# Patient Record
Sex: FEMALE | Race: OTHER | NOT HISPANIC OR LATINO | URBAN - METROPOLITAN AREA
[De-identification: names, ages, dates, MRNs, and addresses within clinical notes are randomized per-mention and may not be internally consistent; named-entity substitution may affect disease eponyms.]

---

## 2017-06-16 NOTE — PATIENT DISCUSSION
RETINA IS ATTACHED OU: PVD OD ONLY; PERIPHERAL RETINOSCHISIS OU; NO NEW HOLES OR TEARS SEEN ON DILATED EXAM TODAY.  RETINAL DETACHMENT SIGNS AND SYMPTOMS REVIEWED

## 2017-08-29 NOTE — PATIENT DISCUSSION
RETINAL DETACHMENT OD: S/P LASER BARRICADE. STABLE. RECOMMEND PATIENT RETURN EVERY 6 MOS TO MONITOR.  CONTINUE CARE WITH DR. Andres Del Cid

## 2017-12-15 NOTE — PATIENT DISCUSSION
RETINAL DETACHMENT OD: S/P LASER BARRICADE. STABLE. RECOMMEND PATIENT RETURN EVERY 6 MONTHS TO MONITOR.

## 2018-06-22 NOTE — PATIENT DISCUSSION
Retinoschisis is an eye disease characterized by the abnormal splitting of the retina's neurosensory layers, usually in the outer plexiform layer. Most common forms are asymptomatic, some rarer forms result in a loss of vision in the corresponding visual field.

## 2018-06-22 NOTE — PATIENT DISCUSSION
REVIEWED PVD/RD PRE-CAUTIONS WITH PATIENT AND ADVISED PT TO CALL IF EXPERIENCES NEW FLASHES OF LIGHT, INCREASE IN FLOATERS, OR DECREASE VISION IN EITHER EYE.

## 2018-06-22 NOTE — PATIENT DISCUSSION
RETINA IS ATTACHED OU: PVD OD ONLY; TEMPORAL RETINAL DETACHMENT SURROUNDED BY LASER OD; STABLE RETINOSCHISIS OU; NO NEW HOLES OR TEARS SEEN ON DILATED EXAM TODAY.  RETINAL DETACHMENT SIGNS AND SYMPTOMS REVIEWED

## 2019-08-16 NOTE — PATIENT DISCUSSION
RETINA IS ATTACHED OU: LATTICE DEGENERATION OS; PVD OD ONLY; TEMPORAL RETINAL DETACHMENT SURROUNDED BY LASER OD; STABLE RETINOSCHISIS OU; NO NEW HOLES OR TEARS SEEN ON DILATED EXAM TODAY.  RETINAL DETACHMENT SIGNS AND SYMPTOMS REVIEWED

## 2020-02-14 NOTE — PATIENT DISCUSSION
RETINA IS ATTACHED OU: PVD OD ONLY; TEMPORAL RETINAL DETACHMENT SURROUNDED BY LASER OD; STABLE RETINOSCHISIS OU; LATTICE DEGENERATION OS; NO NEW HOLES OR TEARS SEEN ON DILATED EXAM TODAY.  RETINAL DETACHMENT SIGNS AND SYMPTOMS REVIEWED

## 2020-08-28 NOTE — PATIENT DISCUSSION
RETINA IS ATTACHED OU: PVD OD; TEMPORAL RETINAL DETACHMENT SURROUNDED BY LASER OD; STABLE RETINOSCHISIS OU; LATTICE DEGENERATION OS; NO NEW HOLES OR TEARS SEEN ON DILATED EXAM TODAY.  RETINAL DETACHMENT SIGNS AND SYMPTOMS REVIEWED

## 2021-08-06 NOTE — PATIENT DISCUSSION
RETINA IS ATTACHED OU: PVD OD; TEMPORAL RETINAL DETACHMENT SURROUNDED BY LASER BARRICADE OD; STABLE PERIPHERAL RETINOSCHISIS OU; LATTICE DEGENERATION OS; NO NEW HOLES OR TEARS SEEN ON DILATED EXAM TODAY.  RETINAL DETACHMENT SIGNS AND SYMPTOMS REVIEWED

## 2022-11-16 ENCOUNTER — NEW PATIENT (OUTPATIENT)
Dept: URBAN - METROPOLITAN AREA CLINIC 17 | Facility: CLINIC | Age: 67
End: 2022-11-16

## 2022-11-16 DIAGNOSIS — H43.813: ICD-10-CM

## 2022-11-16 DIAGNOSIS — H04.123: ICD-10-CM

## 2022-11-16 DIAGNOSIS — H52.4: ICD-10-CM

## 2022-11-16 PROCEDURE — 92134 CPTRZ OPH DX IMG PST SGM RTA: CPT

## 2022-11-16 PROCEDURE — 99204 OFFICE O/P NEW MOD 45 MIN: CPT

## 2022-11-16 ASSESSMENT — TONOMETRY
OS_IOP_MMHG: 20
OD_IOP_MMHG: 19

## 2022-11-16 ASSESSMENT — VISUAL ACUITY
OD_CC: 20/25-2
OS_CC: 20/25-2

## 2022-12-09 NOTE — PATIENT DISCUSSION
Retinoschisis remains stable OU.  The margins of the cavity have not progressed as confirmed by exam.

## 2023-04-05 ENCOUNTER — FOLLOW UP (OUTPATIENT)
Dept: URBAN - METROPOLITAN AREA CLINIC 17 | Facility: CLINIC | Age: 68
End: 2023-04-05

## 2023-04-05 DIAGNOSIS — H01.024: ICD-10-CM

## 2023-04-05 DIAGNOSIS — H01.021: ICD-10-CM

## 2023-04-05 DIAGNOSIS — H04.123: ICD-10-CM

## 2023-04-05 DIAGNOSIS — H02.883: ICD-10-CM

## 2023-04-05 DIAGNOSIS — H02.886: ICD-10-CM

## 2023-04-05 PROCEDURE — 99213 OFFICE O/P EST LOW 20 MIN: CPT

## 2023-04-05 ASSESSMENT — TONOMETRY
OS_IOP_MMHG: 17
OD_IOP_MMHG: 17

## 2023-04-05 ASSESSMENT — VISUAL ACUITY
OD_CC: 20/25
OS_CC: 20/25

## 2023-07-10 ENCOUNTER — FOLLOW UP (OUTPATIENT)
Dept: URBAN - METROPOLITAN AREA CLINIC 17 | Facility: CLINIC | Age: 68
End: 2023-07-10

## 2023-07-10 DIAGNOSIS — H02.886: ICD-10-CM

## 2023-07-10 DIAGNOSIS — H02.883: ICD-10-CM

## 2023-07-10 DIAGNOSIS — H01.024: ICD-10-CM

## 2023-07-10 DIAGNOSIS — H01.021: ICD-10-CM

## 2023-07-10 DIAGNOSIS — H04.123: ICD-10-CM

## 2023-07-10 PROCEDURE — 99213 OFFICE O/P EST LOW 20 MIN: CPT

## 2023-07-10 ASSESSMENT — TONOMETRY
OD_IOP_MMHG: 12
OS_IOP_MMHG: 13

## 2023-07-10 ASSESSMENT — VISUAL ACUITY
OU_CC: 20/20
OS_CC: 20/20
OD_CC: 20/20

## 2023-07-14 ENCOUNTER — EMERGENCY VISIT (OUTPATIENT)
Dept: URBAN - METROPOLITAN AREA CLINIC 17 | Facility: CLINIC | Age: 68
End: 2023-07-14

## 2023-07-14 DIAGNOSIS — H04.123: ICD-10-CM

## 2023-07-14 DIAGNOSIS — B30.9: ICD-10-CM

## 2023-07-14 DIAGNOSIS — H02.886: ICD-10-CM

## 2023-07-14 DIAGNOSIS — H02.883: ICD-10-CM

## 2023-07-14 DIAGNOSIS — H01.024: ICD-10-CM

## 2023-07-14 DIAGNOSIS — H01.021: ICD-10-CM

## 2023-07-14 PROCEDURE — 99213 OFFICE O/P EST LOW 20 MIN: CPT

## 2023-07-14 RX ORDER — ERYTHROMYCIN 5 MG/G: OINTMENT OPHTHALMIC

## 2023-07-14 RX ORDER — TOBRAMYCIN AND DEXAMETHASONE 1; 3 MG/ML; MG/ML: 1 SUSPENSION/ DROPS OPHTHALMIC

## 2023-07-14 ASSESSMENT — VISUAL ACUITY
OD_SC: 20/40+1
OS_SC: 20/20-1

## 2023-07-14 ASSESSMENT — TONOMETRY
OS_IOP_MMHG: 16
OD_IOP_MMHG: 16

## 2023-07-25 ENCOUNTER — FOLLOW UP (OUTPATIENT)
Dept: URBAN - METROPOLITAN AREA CLINIC 17 | Facility: CLINIC | Age: 68
End: 2023-07-25

## 2023-07-25 DIAGNOSIS — H02.883: ICD-10-CM

## 2023-07-25 DIAGNOSIS — H04.123: ICD-10-CM

## 2023-07-25 DIAGNOSIS — H52.4: ICD-10-CM

## 2023-07-25 DIAGNOSIS — H02.886: ICD-10-CM

## 2023-07-25 DIAGNOSIS — B30.9: ICD-10-CM

## 2023-07-25 DIAGNOSIS — H01.021: ICD-10-CM

## 2023-07-25 DIAGNOSIS — H01.024: ICD-10-CM

## 2023-07-25 PROCEDURE — 92015 DETERMINE REFRACTIVE STATE: CPT

## 2023-07-25 PROCEDURE — 99213 OFFICE O/P EST LOW 20 MIN: CPT

## 2023-07-25 ASSESSMENT — TONOMETRY
OS_IOP_MMHG: 16
OD_IOP_MMHG: 19

## 2023-07-25 ASSESSMENT — VISUAL ACUITY
OS_CC: 20/20
OD_CC: 20/20-2

## 2024-03-04 ENCOUNTER — ESTABLISHED PATIENT (OUTPATIENT)
Dept: URBAN - METROPOLITAN AREA CLINIC 17 | Facility: CLINIC | Age: 69
End: 2024-03-04

## 2024-03-04 DIAGNOSIS — H43.813: ICD-10-CM

## 2024-03-04 DIAGNOSIS — H25.13: ICD-10-CM

## 2024-03-04 DIAGNOSIS — H01.024: ICD-10-CM

## 2024-03-04 DIAGNOSIS — H43.822: ICD-10-CM

## 2024-03-04 DIAGNOSIS — H04.123: ICD-10-CM

## 2024-03-04 DIAGNOSIS — H02.883: ICD-10-CM

## 2024-03-04 DIAGNOSIS — H02.886: ICD-10-CM

## 2024-03-04 DIAGNOSIS — E11.9: ICD-10-CM

## 2024-03-04 DIAGNOSIS — H01.021: ICD-10-CM

## 2024-03-04 PROCEDURE — 99214 OFFICE O/P EST MOD 30 MIN: CPT

## 2024-03-04 PROCEDURE — 92134 CPTRZ OPH DX IMG PST SGM RTA: CPT

## 2024-03-04 ASSESSMENT — TONOMETRY
OS_IOP_MMHG: 17
OD_IOP_MMHG: 15

## 2024-03-04 ASSESSMENT — VISUAL ACUITY
OS_CC: 20/30+1
OD_CC: 20/30+1

## 2024-04-08 ENCOUNTER — ESTABLISHED PATIENT (OUTPATIENT)
Dept: URBAN - METROPOLITAN AREA CLINIC 18 | Facility: CLINIC | Age: 69
End: 2024-04-08

## 2024-04-08 DIAGNOSIS — H43.823: ICD-10-CM

## 2024-04-08 DIAGNOSIS — E11.3291: ICD-10-CM

## 2024-04-08 DIAGNOSIS — E11.3212: ICD-10-CM

## 2024-04-08 DIAGNOSIS — H43.813: ICD-10-CM

## 2024-04-08 PROCEDURE — 92134 CPTRZ OPH DX IMG PST SGM RTA: CPT

## 2024-04-08 PROCEDURE — 99214 OFFICE O/P EST MOD 30 MIN: CPT

## 2024-04-08 ASSESSMENT — VISUAL ACUITY
OD_CC: 20/25
OS_CC: 20/25

## 2024-04-08 ASSESSMENT — TONOMETRY
OD_IOP_MMHG: 17
OS_IOP_MMHG: 16

## 2024-06-10 ENCOUNTER — ESTABLISHED PATIENT (OUTPATIENT)
Dept: URBAN - METROPOLITAN AREA CLINIC 18 | Facility: CLINIC | Age: 69
End: 2024-06-10

## 2024-06-10 DIAGNOSIS — E11.3291: ICD-10-CM

## 2024-06-10 DIAGNOSIS — H43.813: ICD-10-CM

## 2024-06-10 DIAGNOSIS — E11.3212: ICD-10-CM

## 2024-06-10 DIAGNOSIS — H43.823: ICD-10-CM

## 2024-06-10 PROCEDURE — 99213 OFFICE O/P EST LOW 20 MIN: CPT

## 2024-06-10 PROCEDURE — 92134 CPTRZ OPH DX IMG PST SGM RTA: CPT

## 2024-06-10 ASSESSMENT — VISUAL ACUITY
OS_CC: 20/30
OD_CC: 20/30

## 2024-06-10 ASSESSMENT — TONOMETRY
OD_IOP_MMHG: 13
OS_IOP_MMHG: 17

## 2024-09-09 ENCOUNTER — COMPREHENSIVE EXAM (OUTPATIENT)
Dept: URBAN - METROPOLITAN AREA CLINIC 17 | Facility: CLINIC | Age: 69
End: 2024-09-09

## 2024-09-09 DIAGNOSIS — H43.813: ICD-10-CM

## 2024-09-09 DIAGNOSIS — H25.13: ICD-10-CM

## 2024-09-09 DIAGNOSIS — H43.823: ICD-10-CM

## 2024-09-09 DIAGNOSIS — E11.3291: ICD-10-CM

## 2024-09-09 DIAGNOSIS — E11.3212: ICD-10-CM

## 2024-09-09 PROCEDURE — 99214 OFFICE O/P EST MOD 30 MIN: CPT

## 2024-09-09 PROCEDURE — 92015 DETERMINE REFRACTIVE STATE: CPT

## 2024-09-09 PROCEDURE — 92134 CPTRZ OPH DX IMG PST SGM RTA: CPT

## 2024-09-09 RX ORDER — CYCLOSPORINE 0.5 MG/ML: 1 EMULSION OPHTHALMIC TWICE A DAY

## 2024-09-23 ENCOUNTER — COMPREHENSIVE EXAM (OUTPATIENT)
Dept: URBAN - METROPOLITAN AREA CLINIC 18 | Facility: CLINIC | Age: 69
End: 2024-09-23

## 2024-09-23 DIAGNOSIS — E11.3212: ICD-10-CM

## 2024-09-23 DIAGNOSIS — H43.813: ICD-10-CM

## 2024-09-23 DIAGNOSIS — E11.3291: ICD-10-CM

## 2024-09-23 DIAGNOSIS — H25.13: ICD-10-CM

## 2024-09-23 DIAGNOSIS — H43.823: ICD-10-CM

## 2024-09-23 PROCEDURE — 92134 CPTRZ OPH DX IMG PST SGM RTA: CPT

## 2024-09-23 PROCEDURE — 99214 OFFICE O/P EST MOD 30 MIN: CPT

## 2024-10-21 ENCOUNTER — EMERGENCY VISIT (OUTPATIENT)
Dept: URBAN - METROPOLITAN AREA CLINIC 10 | Facility: CLINIC | Age: 69
End: 2024-10-21

## 2024-10-21 DIAGNOSIS — H10.13: ICD-10-CM

## 2024-10-21 PROCEDURE — 99214 OFFICE O/P EST MOD 30 MIN: CPT

## 2025-01-13 ENCOUNTER — COMPREHENSIVE EXAM (OUTPATIENT)
Age: 70
End: 2025-01-13

## 2025-01-13 DIAGNOSIS — H25.13: ICD-10-CM

## 2025-01-13 DIAGNOSIS — E11.3212: ICD-10-CM

## 2025-01-13 DIAGNOSIS — H43.813: ICD-10-CM

## 2025-01-13 DIAGNOSIS — H04.123: ICD-10-CM

## 2025-01-13 DIAGNOSIS — H43.821: ICD-10-CM

## 2025-01-13 DIAGNOSIS — E11.3291: ICD-10-CM

## 2025-01-13 PROCEDURE — 99214 OFFICE O/P EST MOD 30 MIN: CPT

## 2025-01-13 PROCEDURE — 92134 CPTRZ OPH DX IMG PST SGM RTA: CPT

## 2025-03-10 ENCOUNTER — FOLLOW UP (OUTPATIENT)
Age: 70
End: 2025-03-10

## 2025-03-10 DIAGNOSIS — H04.123: ICD-10-CM

## 2025-03-10 PROCEDURE — 99213 OFFICE O/P EST LOW 20 MIN: CPT

## 2025-04-07 ENCOUNTER — FOLLOW UP (OUTPATIENT)
Age: 70
End: 2025-04-07

## 2025-04-07 DIAGNOSIS — H04.123: ICD-10-CM

## 2025-04-07 PROCEDURE — 99213 OFFICE O/P EST LOW 20 MIN: CPT

## 2025-04-14 ENCOUNTER — COMPREHENSIVE EXAM (OUTPATIENT)
Age: 70
End: 2025-04-14

## 2025-04-14 DIAGNOSIS — E11.3212: ICD-10-CM

## 2025-04-14 DIAGNOSIS — E11.3291: ICD-10-CM

## 2025-04-14 DIAGNOSIS — H04.123: ICD-10-CM

## 2025-04-14 DIAGNOSIS — H25.13: ICD-10-CM

## 2025-04-14 DIAGNOSIS — H43.813: ICD-10-CM

## 2025-04-14 DIAGNOSIS — H43.821: ICD-10-CM

## 2025-04-14 PROCEDURE — 92134 CPTRZ OPH DX IMG PST SGM RTA: CPT

## 2025-04-14 PROCEDURE — 99214 OFFICE O/P EST MOD 30 MIN: CPT

## 2025-07-07 ENCOUNTER — FOLLOW UP (OUTPATIENT)
Age: 70
End: 2025-07-07

## 2025-07-07 DIAGNOSIS — H04.123: ICD-10-CM

## 2025-07-07 DIAGNOSIS — H25.13: ICD-10-CM

## 2025-07-07 PROCEDURE — 99213 OFFICE O/P EST LOW 20 MIN: CPT
